# Patient Record
Sex: FEMALE | Race: WHITE | NOT HISPANIC OR LATINO | Employment: FULL TIME | URBAN - METROPOLITAN AREA
[De-identification: names, ages, dates, MRNs, and addresses within clinical notes are randomized per-mention and may not be internally consistent; named-entity substitution may affect disease eponyms.]

---

## 2017-04-10 ENCOUNTER — TREATMENT (OUTPATIENT)
Dept: PHYSICAL THERAPY | Facility: CLINIC | Age: 22
End: 2017-04-10

## 2017-04-10 DIAGNOSIS — S39.012D LUMBAR STRAIN, SUBSEQUENT ENCOUNTER: Primary | ICD-10-CM

## 2017-04-10 PROCEDURE — 97110 THERAPEUTIC EXERCISES: CPT | Performed by: PHYSICAL THERAPIST

## 2017-04-10 PROCEDURE — 97140 MANUAL THERAPY 1/> REGIONS: CPT | Performed by: PHYSICAL THERAPIST

## 2017-04-10 PROCEDURE — 97161 PT EVAL LOW COMPLEX 20 MIN: CPT | Performed by: PHYSICAL THERAPIST

## 2017-04-10 PROCEDURE — 97530 THERAPEUTIC ACTIVITIES: CPT | Performed by: PHYSICAL THERAPIST

## 2017-04-10 NOTE — PROGRESS NOTES
Orthopedic / Sports / Industrial Physical Therapy  Physical Therapy Initial Evaluation and Plan of Care    Patient Name: Carey Burden          :  1995  Referring Physician: Self Referral   Diagnosis: Lumbar strain, subsequent encounter [Z29.403W]; SI Jt dysfunction;   Date of Evaluation: 4/10/2017  ______________________________________________________________________  Subjective Evaluation    History of Present Illness  Onset date: 2-3 weeks ago.  Mechanism of injury: Packing and picking up boxes and had sudden onset of LBP initially on right and has persisted.      Pain  Current pain ratin  At worst pain ratin  Location: Across LB...initially in (R) side - Into buttocks on (R) initially - Denies NT-ing  Quality: Ache; Sharp stabbing;   Alleviating factors: Change position; Ice; Prone.  Exacerbated by: Sitting; Rising; walking, standing; stairs; bending; Denies valsalva;   Progression: no change    Diagnostic Tests  No diagnostic tests performed    Treatments  No previous or current treatments  Patient Goals  Patient/family treatment goals: Pain alleviation; Normal mobility, function, and return to normal hobby activities and normal job.         ___________________________________________________  Objective     Postural Observations  Standing posture: (R) Ilium / ASIS / PSIS higher vs (L) initially, then;  Hyperlordodic posture.        Palpation     Additional Palpation Details  Tender R>L LSS / SI;  Tender L5/S1 / Sacral base; Tender L4-5 centrally -     Active Range of Motion     Additional Active Range of Motion Details  Limited and painful Lumbar Flexion with limited reversal lumbar lordosis;   Painful Extension;  Painful (B) SB R>L;    Strength/Myotome Testing     Additional Strength Details  (B) LE Myotome WNL -     Tests     Additional Tests Details  (-) SLR;  (+) SI Jt Dysfunction / Upshear; (+) Flexed sacrum; (+) ERS Lumbar spine;      TREATMENT: %; 2.0 w/cm2; 1.0; x 10 min to  (B) LSS / SI :  Estim (IFC) to (B) LSS / SI region at end of session  THERAPEUTIC EXERCISE:  SKC; PIRIFORMIS; MODIFIED GLUTE Stretch 5/30 sec:  CAT/PRAYER Stretch (B) / R/L 3/30 sec ea  MANUAL THERAPY: Jt Mob (B) SI Jt to correct Upshear x 15 min; Mobs to Sacrum Central / L/R to facilitate sacral extension / lumbar flexion x10 min;  FUNCTIONAL ACTIVITIES: X 10 min  · TAPING / BRACING: NA  · Jt protection, ADL modification; Posture and     ___________________________________________________  Assessment & Plan     Assessment  Assessment details: Lumbar Strain; SI Jt Dysfunction;     PROBLEMS: Pain; Limited mobility; Postural Dysfunction; Limited functional ability;   Prognosis: good  Prognosis details: GOALS:  SHORT TERM GOALS: 2 weeks:  1) HEP Initiated; 2) Pain decreased 50%:   3) AROM  increased:  4) Improved functional ability grossly;     LONG TERM GOALS: 4 weeks (or at time of DISCHARGE): 1) (I) HEP; 2) AROM WFL and pain free; 3) Strength / mobility to be able to perform all ADL's and job-related activities w/o restrictions;       Plan  Planned therapy interventions: abdominal trunk stabilization, body mechanics training, flexibility, home exercise program, joint mobilization, therapeutic activities, stretching, strengthening, spinal/joint mobilization, soft tissue mobilization, postural training, neuromuscular re-education, muscle pump exercises and manual therapy (Modalities prn; )  Frequency: 3x week  Duration in weeks: 4  Treatment plan discussed with: patient      ___________________________________________________  Manual Therapy:    25     mins  51280;   Therapeutic Exercise:    20     mins  44703;     Neuromuscular Terri:    NA    mins  77805;   Therapeutic Activity:     10     mins  55788;     Ultrasound:     10     mins  07039;    Electrical Stimulation:   20     mins  13141 ( );  Dry Needling     NA     mins self-pay   Gait Training:     NA     mins  16687;  EVAL TIME:   25 min -      Timed Treatment:   65   mins                Total Treatment:     90   mins    PT SIGNATURE:   Titus Wallace, PT  DATE TREATMENT INITIATED: 4/10/2017  ___________________________________________________  Initial Certification  Certification Period: 7/9/2017  I certify that the therapy services are furnished while this patient is under my care.  The services outlined above are required by this patient, and will be reviewed every 90 days.     PHYSICIAN: ________________________________  DATE: ______          Please sign and return via fax to [phone].. Thank you, [facility].  ______________________________________________________________________  [address] [place] [address]  Phone: [phone] Fax: [phone]

## 2017-04-12 ENCOUNTER — TREATMENT (OUTPATIENT)
Dept: PHYSICAL THERAPY | Facility: CLINIC | Age: 22
End: 2017-04-12

## 2017-04-12 DIAGNOSIS — M53.3 SACROILIAC JOINT DYSFUNCTION: ICD-10-CM

## 2017-04-12 DIAGNOSIS — S39.012D LUMBAR STRAIN, SUBSEQUENT ENCOUNTER: Primary | ICD-10-CM

## 2017-04-12 PROCEDURE — 97530 THERAPEUTIC ACTIVITIES: CPT | Performed by: PHYSICAL THERAPIST

## 2017-04-12 PROCEDURE — 97110 THERAPEUTIC EXERCISES: CPT | Performed by: PHYSICAL THERAPIST

## 2017-04-12 PROCEDURE — 97140 MANUAL THERAPY 1/> REGIONS: CPT | Performed by: PHYSICAL THERAPIST

## 2017-04-12 NOTE — PROGRESS NOTES
Physical Therapy Daily Progress Note    Patient Name: Carey Burden         :  1995  Referring Physician: -Self Referral -     Subjective   Carey Burden reports: improvement with decreased pain and improved mobility and function. Still notes some pain in central and (R) LBP -increased with prolonged activity such as sitting; Pain with rising, getting out of bed in AM; Pain with bending, but can go farther before pain starts - Denies LE symptoms -     Objective   (R) ilium / ASIS / PSIS higher ; Hyperlordodic posturing; Rounded shoulder / fwd head;   (+) SI Jt dysfunction (R) / Upshear;   (+) Flexed sacrum; (+) ERS to (L) lumbar spine -   Improved, but limited flexion with pain central and (R) LB > (L) -   Tender (R) SI region, and central L4-5 and L5-S1 / sacral base;     See Exercise, Manual, and Modality Logs for complete treatment.     Functional / Therapeutic Activities:  15 min  · TAPING / BRACING: NA  · SEE EXERCISE FLOW SHEET -   · Jt protection, ADL modification; Posture and      Assessment/Plan  Lumbar Strain; SI Jt Dysfunction  Improving with decreased pain and improved mobility and function.   Remaining pain limiting mobility and function, but responds well to MT, etc.     Progress strengthening /stabilization /functional activity  _________________________________________________  Manual Therapy:    30     mins  25279;  Therapeutic Exercise:    25     mins  81697;     Neuromuscular Terri:    NA    mins  28003;    Therapeutic Activity:     15     mins  90773;     Gait Training:      NA     mins  54895;     Ultrasound:     10     mins  61643;    Electrical Stimulation:    20     mins  26164 ( );  Dry Needling     NA     mins self-pay    Timed Treatment:   80   mins                  Total Treatment:     110   mins    Titus Wallace, PT  Physical Therapist

## 2017-04-14 ENCOUNTER — TREATMENT (OUTPATIENT)
Dept: PHYSICAL THERAPY | Facility: CLINIC | Age: 22
End: 2017-04-14

## 2017-04-14 DIAGNOSIS — M53.3 SACROILIAC JOINT DYSFUNCTION: ICD-10-CM

## 2017-04-14 DIAGNOSIS — S39.012D LUMBAR STRAIN, SUBSEQUENT ENCOUNTER: Primary | ICD-10-CM

## 2017-04-14 PROCEDURE — 97110 THERAPEUTIC EXERCISES: CPT | Performed by: PHYSICAL THERAPIST

## 2017-04-14 PROCEDURE — 97530 THERAPEUTIC ACTIVITIES: CPT | Performed by: PHYSICAL THERAPIST

## 2017-04-17 NOTE — PROGRESS NOTES
Physical Therapy Daily Progress Note     Patient Name: Carey Burden : 1995  Referring Physician: -Self Referral -      Subjective   Carey Burden reports: continued improvement with decreased pain and improved mobility and function. Still notes some pain in central and (R) LBP -increased with prolonged activity such as sitting; Less pain with rising, getting out of bed in AM; Pain with bending, but can go farther before pain starts - Denies LE symptoms -      Objective   (R) ilium / ASIS / PSIS higher ; Hyperlordodic posturing; Rounded shoulder / fwd head;   (+) SI Jt dysfunction (R) / Upshear;  -   Improved, but limited flexion with pain central and (R) LB > (L) -   Tender (R) SI region, and central L4-5 and L5-S1 / sacral base;      See Exercise, Manual, and Modality Logs for complete treatment.     Functional / Therapeutic Activities:  15 min  · TAPING / BRACING: NA  · SEE EXERCISE FLOW SHEET -   · Jt protection, ADL modification; Posture and       Assessment/Plan  Lumbar Strain; SI Jt Dysfunction  Improving with decreased pain and improved mobility and function.   Remaining pain limiting mobility and function, but responds well to MT, etc.   Minimal / no pain with AROM WFL w/o pain after MT -      Progress strengthening /stabilization /functional activity  _________________________________________________  Manual Therapy:  20  mins 25849;  Therapeutic Exercise:  40  mins 45506;   Neuromuscular Terri: NA  mins 31455;   Therapeutic Activity:   15  mins 09268;   Gait Training:    NA  mins 77954;   Ultrasound:   10  mins 72568;   Electrical Stimulation:  20  mins 78497 ( );  Dry Needling   NA  mins self-pay     Timed Treatment:  85  mins Total Treatment:  110  mins     Titus Wallace, PT  Physical Therapist

## 2017-04-18 ENCOUNTER — TREATMENT (OUTPATIENT)
Dept: PHYSICAL THERAPY | Facility: CLINIC | Age: 22
End: 2017-04-18

## 2017-04-18 DIAGNOSIS — M53.3 SACROILIAC JOINT DYSFUNCTION: ICD-10-CM

## 2017-04-18 DIAGNOSIS — S39.012D LUMBAR STRAIN, SUBSEQUENT ENCOUNTER: Primary | ICD-10-CM

## 2017-04-18 PROCEDURE — 97110 THERAPEUTIC EXERCISES: CPT | Performed by: PHYSICAL THERAPIST

## 2017-04-18 PROCEDURE — 97530 THERAPEUTIC ACTIVITIES: CPT | Performed by: PHYSICAL THERAPIST

## 2017-04-19 NOTE — PROGRESS NOTES
Physical Therapy Daily Progress Note      Patient Name: Carey Burden : 1995  Referring Physician: -Self Referral -       Subjective   Carey Burden reports: continued improvement with decreased pain and improved mobility and function. Still notes some pain in central  LBP -mostly when she wakes up in AM; Pain with bending, but can go farther before pain starts - Denies LE symptoms -       Objective   Level Pelvis ; Hyperlordodic posturing; Rounded shoulder / fwd head;   (+) Flexed Sacrum    Improved,AROM all planes with mild discomfort -       See Exercise, Manual, and Modality Logs for complete treatment.      Functional / Therapeutic Activities:  15 min  · TAPING / BRACING: NA  · SEE EXERCISE FLOW SHEET -   · Jt protection, ADL modification; Posture and        Assessment/Plan  Lumbar Strain; SI Jt Dysfunction  Improving with decreased pain and improved mobility and function.   Minimal / no pain with AROM WFL w/o pain after MT -       Progress strengthening /stabilization /functional activity  _________________________________________________  Manual Therapy:  10  mins 43472;  Therapeutic Exercise:  55  mins 73169;   Neuromuscular Terri: NA  mins 19044;   Therapeutic Activity:  15  mins 98209;   Gait Training:  NA  mins 41484;   Ultrasound:  NA  mins 26137;   Electrical Stimulation:  20  mins 13140 ( );  Dry Needling  NA  mins self-pay      Timed Treatment:  80  mins Total Treatment:  110  mins      Titus Wallace PT  Physical Therapist

## 2017-04-20 ENCOUNTER — TREATMENT (OUTPATIENT)
Dept: PHYSICAL THERAPY | Facility: CLINIC | Age: 22
End: 2017-04-20

## 2017-04-20 DIAGNOSIS — S39.012D LUMBAR STRAIN, SUBSEQUENT ENCOUNTER: ICD-10-CM

## 2017-04-20 DIAGNOSIS — M53.3 SACROILIAC JOINT DYSFUNCTION: Primary | ICD-10-CM

## 2017-04-20 PROCEDURE — 97110 THERAPEUTIC EXERCISES: CPT | Performed by: PHYSICAL THERAPIST

## 2017-04-20 PROCEDURE — 97530 THERAPEUTIC ACTIVITIES: CPT | Performed by: PHYSICAL THERAPIST

## 2017-04-23 NOTE — PROGRESS NOTES
Physical Therapy Daily Progress Note      Patient Name: Carey Burden : 1995  Referring Physician: -Self Referral -       Subjective   Carey Burden reports: continued improvement with decreased pain and improved mobility and function. Still notes some pain in central LBP -mostly when she wakes up in AM; and after prolonged sitting in class - Admits to sleeping on her stomach and has a soft mattress -        Objective   Level Pelvis ; Hyperlordodic posturing; Rounded shoulder / fwd head;   (+) Flexed Sacrum    Improved,AROM all planes with mild discomfort -       See Exercise, Manual, and Modality Logs for complete treatment.      Functional / Therapeutic Activities:  15 min  · TAPING / BRACING: NA  · SEE EXERCISE FLOW SHEET -   · Jt protection, ADL modification; Posture and        Assessment/Plan  Lumbar Strain; SI Jt Dysfunction  Improving with decreased pain and improved mobility and function.   Minimal / no pain with AROM WFL w/o pain after MT -       Progress strengthening /stabilization /functional activity  _________________________________________________  Manual Therapy:  10  mins 14444;  Therapeutic Exercise:  45  mins 55835;   Neuromuscular Terri: NA  mins 72947;   Therapeutic Activity:  15  mins 30930;   Gait Training:  NA  mins 70127;   Ultrasound:  NA  mins 54450;   Electrical Stimulation:  20  mins 84717 ( );  Dry Needling  NA  mins self-pay      Timed Treatment:  70  mins Total Treatment:  100  mins      Titus Wallace PT  Physical Therapist

## 2017-04-28 ENCOUNTER — TREATMENT (OUTPATIENT)
Dept: PHYSICAL THERAPY | Facility: CLINIC | Age: 22
End: 2017-04-28

## 2017-04-28 DIAGNOSIS — M53.3 SACROILIAC JOINT DYSFUNCTION: Primary | ICD-10-CM

## 2017-04-28 DIAGNOSIS — S39.012D LUMBAR STRAIN, SUBSEQUENT ENCOUNTER: ICD-10-CM

## 2017-04-28 PROCEDURE — 97530 THERAPEUTIC ACTIVITIES: CPT | Performed by: PHYSICAL THERAPIST

## 2017-04-28 PROCEDURE — 97110 THERAPEUTIC EXERCISES: CPT | Performed by: PHYSICAL THERAPIST

## 2017-05-09 ENCOUNTER — TREATMENT (OUTPATIENT)
Dept: PHYSICAL THERAPY | Facility: CLINIC | Age: 22
End: 2017-05-09

## 2017-05-09 DIAGNOSIS — S39.012D LUMBAR STRAIN, SUBSEQUENT ENCOUNTER: ICD-10-CM

## 2017-05-09 DIAGNOSIS — M53.3 SACROILIAC JOINT DYSFUNCTION: Primary | ICD-10-CM

## 2017-05-09 PROCEDURE — 97530 THERAPEUTIC ACTIVITIES: CPT | Performed by: PHYSICAL THERAPIST

## 2017-05-09 PROCEDURE — 97110 THERAPEUTIC EXERCISES: CPT | Performed by: PHYSICAL THERAPIST

## 2017-05-10 ENCOUNTER — TREATMENT (OUTPATIENT)
Dept: PHYSICAL THERAPY | Facility: CLINIC | Age: 22
End: 2017-05-10

## 2017-05-10 DIAGNOSIS — S39.012D LUMBAR STRAIN, SUBSEQUENT ENCOUNTER: ICD-10-CM

## 2017-05-10 DIAGNOSIS — M53.3 SACROILIAC JOINT DYSFUNCTION: Primary | ICD-10-CM

## 2017-05-10 PROCEDURE — 97110 THERAPEUTIC EXERCISES: CPT | Performed by: PHYSICAL THERAPIST

## 2017-05-10 PROCEDURE — 97530 THERAPEUTIC ACTIVITIES: CPT | Performed by: PHYSICAL THERAPIST

## 2017-05-17 ENCOUNTER — TREATMENT (OUTPATIENT)
Dept: PHYSICAL THERAPY | Facility: CLINIC | Age: 22
End: 2017-05-17

## 2017-05-17 DIAGNOSIS — S39.012D LUMBAR STRAIN, SUBSEQUENT ENCOUNTER: ICD-10-CM

## 2017-05-17 DIAGNOSIS — M53.3 SACROILIAC JOINT DYSFUNCTION: Primary | ICD-10-CM

## 2017-05-17 PROCEDURE — 97112 NEUROMUSCULAR REEDUCATION: CPT | Performed by: PHYSICAL THERAPIST

## 2017-05-17 PROCEDURE — 97110 THERAPEUTIC EXERCISES: CPT | Performed by: PHYSICAL THERAPIST

## 2017-05-17 PROCEDURE — 97530 THERAPEUTIC ACTIVITIES: CPT | Performed by: PHYSICAL THERAPIST

## 2017-05-25 ENCOUNTER — TREATMENT (OUTPATIENT)
Dept: PHYSICAL THERAPY | Facility: CLINIC | Age: 22
End: 2017-05-25

## 2017-05-25 DIAGNOSIS — M53.3 SACROILIAC JOINT DYSFUNCTION: Primary | ICD-10-CM

## 2017-05-25 DIAGNOSIS — S39.012D LUMBAR STRAIN, SUBSEQUENT ENCOUNTER: ICD-10-CM

## 2017-05-25 PROCEDURE — 97110 THERAPEUTIC EXERCISES: CPT | Performed by: PHYSICAL THERAPIST

## 2017-05-25 PROCEDURE — 97530 THERAPEUTIC ACTIVITIES: CPT | Performed by: PHYSICAL THERAPIST

## 2017-05-25 PROCEDURE — 97112 NEUROMUSCULAR REEDUCATION: CPT | Performed by: PHYSICAL THERAPIST

## 2018-05-09 ENCOUNTER — TREATMENT (OUTPATIENT)
Dept: PHYSICAL THERAPY | Facility: CLINIC | Age: 23
End: 2018-05-09

## 2018-05-09 DIAGNOSIS — M53.3 SACROILIAC JOINT DYSFUNCTION: Primary | ICD-10-CM

## 2018-05-09 DIAGNOSIS — S39.012D LUMBAR STRAIN, SUBSEQUENT ENCOUNTER: ICD-10-CM

## 2018-05-09 PROCEDURE — 97161 PT EVAL LOW COMPLEX 20 MIN: CPT | Performed by: PHYSICAL THERAPIST

## 2018-05-09 PROCEDURE — 97110 THERAPEUTIC EXERCISES: CPT | Performed by: PHYSICAL THERAPIST

## 2018-05-09 PROCEDURE — 97530 THERAPEUTIC ACTIVITIES: CPT | Performed by: PHYSICAL THERAPIST

## 2018-05-09 PROCEDURE — 97140 MANUAL THERAPY 1/> REGIONS: CPT | Performed by: PHYSICAL THERAPIST

## 2018-05-09 NOTE — PROGRESS NOTES
Orthopedic / Sports / Industrial Physical Therapy  Physical Therapy Initial Evaluation and Plan of Care    Patient Name: Carey Burden          :  1995  Referring Physician: Carey Morataya MD  Diagnosis: Sacroiliac joint dysfunction [M53.3]  ; Lumbar Strain  Date of Evaluation: 2018  ______________________________________________________________________      Subjective Evaluation    History of Present Illness  Date of onset: 2018  Mechanism of injury: Rolled over in bed and felt immediate pain in central LB -      Patient Occupation: Student - Nursing Pain  Current pain ratin  At worst pain ratin  Location: Central LBP R>L  Quality: Sharp; Ache; Sore.  Alleviating factors: Prone;   Exacerbated by: Bend over; Sitting; Rising; palpation; walking; prolonged positioning - Tying shoes -   Symptom course: Improved overall -     Social Support  Lives with: parents    Diagnostic Tests  No diagnostic tests performed    Treatments  Previous treatment: physical therapy  Current treatment comments: Tylenol and ibuprofen.     Patient Goals  Patient/family treatment goals: Pain alleviation; Normal mobility; function; and able to perform duties for her [facility] clinicals -          ___________________________________________________  Objective       Postural Observations    Additional Postural Observation Details  Hyperlordosis; (L) Ilium / ASIS / PSIS higher  Rounded shoulder posture with increased T-kyphosis    Tenderness     Additional Tenderness Details  Tender central L4,5,Sacral base; (L) LPS same; (L)>(R) SI region    Active Range of Motion     Additional Active Range of Motion Details  Painful Flexion central LSS and R/L 2/3 normal  Extension WF/NL with pain at LSS/Sacral base region  SB WFL with pain R/L LSS/sacral region     Strength/Myotome Testing     Additional Strength Details  LE Myotomes WNL (B); Heel/Toe Walk WNL (B)    Tests     Additional Tests Details  (+) SI Jt Dysfunction (L)  >(R)  (+) Flexed Sacrum  (+) SLR (L)      See Treatment Flow sheet for Exercises, Manual therapy, and modalities.   FUNCTIONAL ACTIVITIES: X 10 min  · TAPING / BRACING:   · Jt protection, ADL modification; Posture and     ___________________________________________________  Assessment & Plan     Assessment  Assessment details: Lumbar strain; SI Jt Dysfunction L>R    PROBLEMS: Pain; Limited mobility, function, and unable to participate in hobby activities  PROGNOSIS: Good    GOALS:   SHORT TERM GOALS: 2 weeks:  1) HEP Initiated; 2) Pain decreased 50%:   3) AROM  increased:  4) Improved functional ability grossly;   LONG TERM GOALS: 4 weeks (or at time of DISCHARGE): 1) (I) HEP; 2) AROM WFL and pain free; 3) Strength / mobility to be able to perform all ADL's and job-related activities w/o restrictions;      Plan  Planned therapy interventions: abdominal trunk stabilization, body mechanics training, flexibility, home exercise program, manual therapy, neuromuscular re-education, postural training, soft tissue mobilization, spinal/joint mobilization, strengthening, stretching and therapeutic activities (Modalities prn; Taping prn; )  Frequency: 2x week  Duration in weeks: 4  Treatment plan discussed with: patient    ___________________________________________________  Manual Therapy:    15     mins  34688;   Therapeutic Exercise:    10     mins  14477;     Neuromuscular Terri:        mins  16203;   Therapeutic Activity:     10     mins  56965;     Ultrasound:     10     mins  38096;    Electrical Stimulation:   20     mins  37916 ( );  Dry Needling          mins self-pay   Gait Training:          mins  19121;  EVAL TIME:   20 mins    Timed Treatment:   45   mins                Total Treatment:     95   mins    PT SIGNATURE:   Titus Wallace, PT  DATE TREATMENT INITIATED: 5/9/2018  ___________________________________________________  Initial Certification  Certification Period: 8/7/2018  I certify that the  therapy services are furnished while this patient is under my care.  The services outlined above are required by this patient, and will be reviewed every 90 days.     PHYSICIAN: ________________________________  DATE: ______  Carey Morataya MD        Please sign and return via fax to [phone].. Thank you, [facility].  ______________________________________________________________________  [address] [place] [address]  Phone: [phone] Fax: [phone]

## 2018-05-14 ENCOUNTER — TREATMENT (OUTPATIENT)
Dept: PHYSICAL THERAPY | Facility: CLINIC | Age: 23
End: 2018-05-14

## 2018-05-14 DIAGNOSIS — M53.3 SACROILIAC JOINT DYSFUNCTION: Primary | ICD-10-CM

## 2018-05-14 DIAGNOSIS — S39.012D LUMBAR STRAIN, SUBSEQUENT ENCOUNTER: ICD-10-CM

## 2018-05-14 PROCEDURE — 97110 THERAPEUTIC EXERCISES: CPT | Performed by: PHYSICAL THERAPIST

## 2018-05-14 PROCEDURE — 97140 MANUAL THERAPY 1/> REGIONS: CPT | Performed by: PHYSICAL THERAPIST

## 2018-05-14 PROCEDURE — 97530 THERAPEUTIC ACTIVITIES: CPT | Performed by: PHYSICAL THERAPIST

## 2018-05-14 NOTE — PROGRESS NOTES
Physical Therapy Daily Progress Note    Patient Name: Carey Burden         :  1995  Referring Physician: Carey Morataya MD    Subjective   Carey Burden reports: minimal / no pain after last session and for the next couple of days - She only noted soreness in central LSS and (L) late yesterday and today intermittently -    Objective   (L) Ilium / PSIS / ASIS higher vs (R); Hyperlordosis;   (+) SI Jt Dysfunction / Upshear (L);  Flexed sacrum (central and (L))  Improved AROM all planes lumbar spine with no pain except for SB with pain (L) LSS/SI / sacral region    See Exercise, Manual, and Modality Logs for complete treatment.     Functional / Therapeutic Activities:  15 min  · TAPING / BRACING: NA  · See flow sheet -   · Jt protection, ADL modification; Posture and      Assessment/Plan  Lumbar strain; SI Jt Dysfunction L>R  Improving with decreased pain and improved mobility and function -     Progress strengthening /stabilization /functional activity       _________________________________________________  Manual Therapy:    15     mins  46518;  Therapeutic Exercise:    25     mins  91665;     Neuromuscular Terri:        mins  54149;    Therapeutic Activity:     15     mins  06420;     Gait Training:           mins  17437;     Ultrasound:          mins  99135;    Electrical Stimulation:    15     mins  60342 ( );  Dry Needling          mins self-pay    Timed Treatment:   55   mins                  Total Treatment:     80   mins    Titus Wallace PT  Physical Therapist

## 2018-05-16 ENCOUNTER — TREATMENT (OUTPATIENT)
Dept: PHYSICAL THERAPY | Facility: CLINIC | Age: 23
End: 2018-05-16

## 2018-05-16 DIAGNOSIS — M53.3 SACROILIAC JOINT DYSFUNCTION: Primary | ICD-10-CM

## 2018-05-16 DIAGNOSIS — S39.012D LUMBAR STRAIN, SUBSEQUENT ENCOUNTER: ICD-10-CM

## 2018-05-16 PROCEDURE — 97110 THERAPEUTIC EXERCISES: CPT | Performed by: PHYSICAL THERAPIST

## 2018-05-16 PROCEDURE — 97140 MANUAL THERAPY 1/> REGIONS: CPT | Performed by: PHYSICAL THERAPIST

## 2018-05-16 PROCEDURE — 97530 THERAPEUTIC ACTIVITIES: CPT | Performed by: PHYSICAL THERAPIST

## 2018-05-16 NOTE — PROGRESS NOTES
Physical Therapy Daily Progress Note     Patient Name: Carey Burden         :  1995  Referring Physician: Carey Morataya MD     Subjective   Carey Burden reports: minimal / no pain after last session and for the next couple of days - She only notes some soreness in central LSS and (L)  -     Objective   Improved pelvic alignment -     Flexed sacrum (central and (L))  Improved AROM all planes lumbar spine with no pain other than HS tightness L>R and improved reversal of lumbar lordosis with flexion -    See Exercise, Manual, and Modality Logs for complete treatment.     Functional / Therapeutic Activities:  15 min  · TAPING / BRACING: NA  · See flow sheet -   · Jt protection, ADL modification; Posture and       Assessment/Plan  Lumbar strain; SI Jt Dysfunction L>R  Improving with decreased pain and improved mobility and function -      Progress strengthening /stabilization /functional activity     _________________________________________________  Manual Therapy:            10     mins  63617;  Therapeutic Exercise:    40     mins  74484;     Neuromuscular Terri:        mins  13599;    Therapeutic Activity:      20     mins  96192;     Gait Training:                      mins  95761;     Ultrasound:                          mins  20952;    Electrical Stimulation:    15     mins  94784 ( );  Dry Needling                       mins self-pay     Timed Treatment:   70   mins                  Total Treatment:     90   mins     Titus Wallace PT  Physical Therapist

## 2018-05-23 ENCOUNTER — TREATMENT (OUTPATIENT)
Dept: PHYSICAL THERAPY | Facility: CLINIC | Age: 23
End: 2018-05-23

## 2018-05-23 DIAGNOSIS — S39.012D LUMBAR STRAIN, SUBSEQUENT ENCOUNTER: ICD-10-CM

## 2018-05-23 DIAGNOSIS — M53.3 SACROILIAC JOINT DYSFUNCTION: Primary | ICD-10-CM

## 2018-05-23 PROCEDURE — 97530 THERAPEUTIC ACTIVITIES: CPT | Performed by: PHYSICAL THERAPIST

## 2018-05-23 PROCEDURE — 97110 THERAPEUTIC EXERCISES: CPT | Performed by: PHYSICAL THERAPIST

## 2018-05-28 NOTE — PROGRESS NOTES
Physical Therapy Daily Progress Note     Patient Name: Carey Burden         :  1995  Referring Physician: Carey Morataya MD     Subjective   Carey Burden reports: minimal / no pain after last session and for the next couple of days -     Objective   Improved pelvic alignment -    (-) Flexed sacrum (central and (L))  Improved AROM all planes lumbar spine with no pain other than HS tightness L>R and improved reversal of lumbar lordosis with flexion -     See Exercise, Manual, and Modality Logs for complete treatment.     Functional / Therapeutic Activities:  20 min  · TAPING / BRACING: NA  · See flow sheet -   · Jt protection, ADL modification; Posture and       Assessment/Plan  Lumbar strain; SI Jt Dysfunction L>R  Improving with decreased pain and improved mobility and function -      Progress strengthening /stabilization /functional activity  DC to HEP next session if continued improvement =      _________________________________________________  Manual Therapy:                 mins  44361;  Therapeutic Exercise:    40     mins  28336;     Neuromuscular Terri:        mins  82479;    Therapeutic Activity:      20     mins  37974;     Gait Training:                      mins  91243;     Ultrasound:                          mins  36381;    Electrical Stimulation:    15     mins  03278 ( );  Dry Needling                       mins self-pay     Timed Treatment:   60   mins                  Total Treatment:     85   mins     Titus Wallace PT  Physical Therapist

## 2018-06-01 ENCOUNTER — TREATMENT (OUTPATIENT)
Dept: PHYSICAL THERAPY | Facility: CLINIC | Age: 23
End: 2018-06-01

## 2018-06-01 DIAGNOSIS — M53.3 SACROILIAC JOINT DYSFUNCTION: Primary | ICD-10-CM

## 2018-06-01 DIAGNOSIS — S39.012D LUMBAR STRAIN, SUBSEQUENT ENCOUNTER: ICD-10-CM

## 2018-06-01 PROCEDURE — 97110 THERAPEUTIC EXERCISES: CPT | Performed by: PHYSICAL THERAPIST

## 2018-06-01 PROCEDURE — 97530 THERAPEUTIC ACTIVITIES: CPT | Performed by: PHYSICAL THERAPIST

## 2018-06-03 NOTE — PROGRESS NOTES
Physical Therapy Daily Progress Note     Patient Name: Carey Burden         :  1995  Referring Physician: Carey Morataya MD     Subjective   Carey Burden reports: minimal / no pain after last session and for the next couple of days - Notes no functional limitations - Back to kick boxing class w/o pain -      Objective   Improved pelvic alignment -    (-) Flexed sacrum (central and (L))  Improved AROM all planes lumbar spine with no pain other than HS tightness L>R and improved reversal of lumbar lordosis with flexion -      See Exercise, Manual, and Modality Logs for complete treatment.     Functional / Therapeutic Activities:  20 min  · TAPING / BRACING: NA  · See flow sheet -   · Jt protection, ADL modification; Posture and       Assessment/Plan  Lumbar strain; SI Jt Dysfunction L>R  Improving with decreased pain and improved mobility and function -   No functional limitations-  All goals met -      DC to HEP  -     _________________________________________________  Manual Therapy:                 mins  49751;  Therapeutic Exercise:    40     mins  93035;     Neuromuscular Terri:        mins  31530;    Therapeutic Activity:      20     mins  26304;     Gait Training:                      mins  39787;     Ultrasound:                          mins  72725;    Electrical Stimulation:         mins  71873 ( );  Dry Needling                       mins self-pay     Timed Treatment:   60   mins                  Total Treatment:     70   mins     Titus Wallace PT  Physical Therapist

## 2021-09-07 ENCOUNTER — APPOINTMENT (OUTPATIENT)
Dept: GENERAL RADIOLOGY | Facility: HOSPITAL | Age: 26
End: 2021-09-07

## 2021-09-07 ENCOUNTER — HOSPITAL ENCOUNTER (EMERGENCY)
Facility: HOSPITAL | Age: 26
Discharge: HOME | End: 2021-09-07
Admitting: EMERGENCY MEDICINE

## 2021-09-07 VITALS
TEMPERATURE: 98.4 F | BODY MASS INDEX: 48.55 KG/M2 | RESPIRATION RATE: 15 BRPM | OXYGEN SATURATION: 100 % | HEIGHT: 64 IN | WEIGHT: 284.39 LBS | SYSTOLIC BLOOD PRESSURE: 126 MMHG | DIASTOLIC BLOOD PRESSURE: 78 MMHG | HEART RATE: 79 BPM

## 2021-09-07 DIAGNOSIS — R07.89 CHEST WALL PAIN: ICD-10-CM

## 2021-09-07 DIAGNOSIS — Z92.29 COVID-19 VACCINE SERIES COMPLETED: ICD-10-CM

## 2021-09-07 DIAGNOSIS — F41.9 ANXIETY: ICD-10-CM

## 2021-09-07 DIAGNOSIS — U07.1 COVID-19: Primary | ICD-10-CM

## 2021-09-07 LAB
ALBUMIN SERPL-MCNC: 4.1 G/DL (ref 3.5–5.2)
ALBUMIN/GLOB SERPL: 1.2 G/DL
ALP SERPL-CCNC: 72 U/L (ref 39–117)
ALT SERPL W P-5'-P-CCNC: 31 U/L (ref 1–33)
ANION GAP SERPL CALCULATED.3IONS-SCNC: 14 MMOL/L (ref 5–15)
AST SERPL-CCNC: 29 U/L (ref 1–32)
BASOPHILS # BLD AUTO: 0 10*3/MM3 (ref 0–0.2)
BASOPHILS NFR BLD AUTO: 0.6 % (ref 0–1.5)
BILIRUB SERPL-MCNC: 0.3 MG/DL (ref 0–1.2)
BUN SERPL-MCNC: 5 MG/DL (ref 6–20)
BUN/CREAT SERPL: 7.6 (ref 7–25)
CALCIUM SPEC-SCNC: 9 MG/DL (ref 8.6–10.5)
CHLORIDE SERPL-SCNC: 105 MMOL/L (ref 98–107)
CO2 SERPL-SCNC: 21 MMOL/L (ref 22–29)
CREAT SERPL-MCNC: 0.66 MG/DL (ref 0.57–1)
D DIMER PPP FEU-MCNC: 0.2 MG/L (FEU) (ref 0–0.59)
DEPRECATED RDW RBC AUTO: 39.8 FL (ref 37–54)
EOSINOPHIL # BLD AUTO: 0 10*3/MM3 (ref 0–0.4)
EOSINOPHIL NFR BLD AUTO: 0.2 % (ref 0.3–6.2)
ERYTHROCYTE [DISTWIDTH] IN BLOOD BY AUTOMATED COUNT: 13.4 % (ref 12.3–15.4)
GFR SERPL CREATININE-BSD FRML MDRD: 111 ML/MIN/1.73
GLOBULIN UR ELPH-MCNC: 3.5 GM/DL
GLUCOSE SERPL-MCNC: 100 MG/DL (ref 65–99)
HCT VFR BLD AUTO: 38.3 % (ref 34–46.6)
HGB BLD-MCNC: 13 G/DL (ref 12–15.9)
LYMPHOCYTES # BLD AUTO: 2 10*3/MM3 (ref 0.7–3.1)
LYMPHOCYTES NFR BLD AUTO: 29.7 % (ref 19.6–45.3)
MCH RBC QN AUTO: 28.4 PG (ref 26.6–33)
MCHC RBC AUTO-ENTMCNC: 34 G/DL (ref 31.5–35.7)
MCV RBC AUTO: 83.5 FL (ref 79–97)
MONOCYTES # BLD AUTO: 0.4 10*3/MM3 (ref 0.1–0.9)
MONOCYTES NFR BLD AUTO: 5.9 % (ref 5–12)
NEUTROPHILS NFR BLD AUTO: 4.3 10*3/MM3 (ref 1.7–7)
NEUTROPHILS NFR BLD AUTO: 63.6 % (ref 42.7–76)
NRBC BLD AUTO-RTO: 0.1 /100 WBC (ref 0–0.2)
PLATELET # BLD AUTO: 195 10*3/MM3 (ref 140–450)
PMV BLD AUTO: 9.4 FL (ref 6–12)
POTASSIUM SERPL-SCNC: 3.2 MMOL/L (ref 3.5–5.2)
PROT SERPL-MCNC: 7.6 G/DL (ref 6–8.5)
RBC # BLD AUTO: 4.59 10*6/MM3 (ref 3.77–5.28)
SODIUM SERPL-SCNC: 140 MMOL/L (ref 136–145)
WBC # BLD AUTO: 6.8 10*3/MM3 (ref 3.4–10.8)

## 2021-09-07 PROCEDURE — 96361 HYDRATE IV INFUSION ADD-ON: CPT

## 2021-09-07 PROCEDURE — 80053 COMPREHEN METABOLIC PANEL: CPT | Performed by: NURSE PRACTITIONER

## 2021-09-07 PROCEDURE — 25010000002 ONDANSETRON PER 1 MG: Performed by: NURSE PRACTITIONER

## 2021-09-07 PROCEDURE — 71045 X-RAY EXAM CHEST 1 VIEW: CPT

## 2021-09-07 PROCEDURE — 96374 THER/PROPH/DIAG INJ IV PUSH: CPT

## 2021-09-07 PROCEDURE — 85025 COMPLETE CBC W/AUTO DIFF WBC: CPT | Performed by: NURSE PRACTITIONER

## 2021-09-07 PROCEDURE — 36415 COLL VENOUS BLD VENIPUNCTURE: CPT | Performed by: NURSE PRACTITIONER

## 2021-09-07 PROCEDURE — 25010000002 LORAZEPAM PER 2 MG: Performed by: NURSE PRACTITIONER

## 2021-09-07 PROCEDURE — 36415 COLL VENOUS BLD VENIPUNCTURE: CPT

## 2021-09-07 PROCEDURE — 99283 EMERGENCY DEPT VISIT LOW MDM: CPT

## 2021-09-07 PROCEDURE — 25010000002 KETOROLAC TROMETHAMINE PER 15 MG: Performed by: NURSE PRACTITIONER

## 2021-09-07 PROCEDURE — 96375 TX/PRO/DX INJ NEW DRUG ADDON: CPT

## 2021-09-07 PROCEDURE — 85379 FIBRIN DEGRADATION QUANT: CPT | Performed by: NURSE PRACTITIONER

## 2021-09-07 RX ORDER — BROMPHENIRAMINE MALEATE, PSEUDOEPHEDRINE HYDROCHLORIDE, AND DEXTROMETHORPHAN HYDROBROMIDE 2; 30; 10 MG/5ML; MG/5ML; MG/5ML
5 SYRUP ORAL 4 TIMES DAILY PRN
Qty: 120 ML | Refills: 0 | OUTPATIENT
Start: 2021-09-07 | End: 2022-09-30

## 2021-09-07 RX ORDER — LORAZEPAM 2 MG/ML
0.5 INJECTION INTRAMUSCULAR ONCE
Status: COMPLETED | OUTPATIENT
Start: 2021-09-07 | End: 2021-09-07

## 2021-09-07 RX ORDER — KETOROLAC TROMETHAMINE 15 MG/ML
15 INJECTION, SOLUTION INTRAMUSCULAR; INTRAVENOUS ONCE
Status: COMPLETED | OUTPATIENT
Start: 2021-09-07 | End: 2021-09-07

## 2021-09-07 RX ORDER — HYDROCODONE BITARTRATE AND ACETAMINOPHEN 7.5; 325 MG/1; MG/1
1 TABLET ORAL ONCE
Status: COMPLETED | OUTPATIENT
Start: 2021-09-07 | End: 2021-09-07

## 2021-09-07 RX ORDER — DICLOFENAC SODIUM 75 MG/1
75 TABLET, DELAYED RELEASE ORAL 2 TIMES DAILY PRN
Qty: 20 TABLET | Refills: 0 | Status: SHIPPED | OUTPATIENT
Start: 2021-09-07 | End: 2021-09-17

## 2021-09-07 RX ORDER — SODIUM CHLORIDE 0.9 % (FLUSH) 0.9 %
10 SYRINGE (ML) INJECTION AS NEEDED
Status: DISCONTINUED | OUTPATIENT
Start: 2021-09-07 | End: 2021-09-07 | Stop reason: HOSPADM

## 2021-09-07 RX ORDER — ONDANSETRON 2 MG/ML
4 INJECTION INTRAMUSCULAR; INTRAVENOUS ONCE
Status: COMPLETED | OUTPATIENT
Start: 2021-09-07 | End: 2021-09-07

## 2021-09-07 RX ORDER — DEXAMETHASONE 4 MG/1
6 TABLET ORAL 2 TIMES DAILY WITH MEALS
Qty: 15 TABLET | Refills: 0 | Status: SHIPPED | OUTPATIENT
Start: 2021-09-07 | End: 2021-09-12

## 2021-09-07 RX ADMIN — HYDROCODONE BITARTRATE AND ACETAMINOPHEN 1 TABLET: 7.5; 325 TABLET ORAL at 13:50

## 2021-09-07 RX ADMIN — SODIUM CHLORIDE 1000 ML: 9 INJECTION, SOLUTION INTRAVENOUS at 12:06

## 2021-09-07 RX ADMIN — KETOROLAC TROMETHAMINE 15 MG: 15 INJECTION, SOLUTION INTRAMUSCULAR; INTRAVENOUS at 12:06

## 2021-09-07 RX ADMIN — LORAZEPAM 0.5 MG: 2 INJECTION INTRAMUSCULAR; INTRAVENOUS at 12:06

## 2021-09-07 RX ADMIN — ONDANSETRON 4 MG: 2 INJECTION INTRAMUSCULAR; INTRAVENOUS at 12:06

## 2021-09-07 NOTE — ED NOTES
Patient tested positive for Covid yesterday. Had Regeneron yesterday. Today she complains of Soa, and chest discomfort     Nancy Zaragoza RN  09/07/21 8550

## 2021-09-07 NOTE — ED PROVIDER NOTES
Subjective   Patient is a 25-year-old obese female who received the Pfizer vaccine in March of this year but tested positive for Covid 1 week ago she comes in today very anxious breathing about 30 times a minute stating that she cannot catch her breath but her oxygen saturation was 100%.  She received the Regeneron infusion yesterday-states she was feeling worse today she also complains of some chest pain with her breathing.  States she has had cough which has been dry she denies fever in the last 24 hours-she rates her pain a 6/10          Review of Systems   Constitutional: Negative for chills, fatigue and fever.   HENT: Negative for congestion, tinnitus and trouble swallowing.    Eyes: Negative for photophobia, discharge and redness.   Respiratory: Positive for cough and shortness of breath.    Cardiovascular: Positive for chest pain. Negative for palpitations.   Gastrointestinal: Negative for abdominal pain, diarrhea, nausea and vomiting.   Genitourinary: Negative for dysuria, frequency and urgency.   Musculoskeletal: Negative for back pain, joint swelling and myalgias.   Skin: Negative for rash.   Neurological: Negative for dizziness and headaches.   Psychiatric/Behavioral: Negative for confusion. The patient is nervous/anxious.    All other systems reviewed and are negative.      History reviewed. No pertinent past medical history.    No Known Allergies    Past Surgical History:   Procedure Laterality Date   • ADENOIDECTOMY     • TONSILLECTOMY         History reviewed. No pertinent family history.    Social History     Socioeconomic History   • Marital status: Single     Spouse name: Not on file   • Number of children: Not on file   • Years of education: Not on file   • Highest education level: Not on file   Tobacco Use   • Smoking status: Never Smoker   • Smokeless tobacco: Never Used   Substance and Sexual Activity   • Alcohol use: Yes     Comment: occassionally           Objective   Physical Exam  Vitals  reviewed.   Constitutional:       General: She is not in acute distress.     Appearance: She is well-developed. She is obese. She is not ill-appearing, toxic-appearing or diaphoretic.   HENT:      Head: Normocephalic and atraumatic.      Mouth/Throat:      Mouth: Mucous membranes are moist.   Eyes:      Conjunctiva/sclera: Conjunctivae normal.      Pupils: Pupils are equal, round, and reactive to light.   Cardiovascular:      Rate and Rhythm: Normal rate and regular rhythm.      Heart sounds: Normal heart sounds.   Pulmonary:      Effort: Pulmonary effort is normal. No respiratory distress.      Breath sounds: Examination of the right-lower field reveals decreased breath sounds. Examination of the left-lower field reveals decreased breath sounds. Decreased breath sounds present. No wheezing.   Abdominal:      General: Bowel sounds are normal. There is no distension.      Palpations: Abdomen is soft. There is no mass.      Tenderness: There is no abdominal tenderness. There is no guarding or rebound.   Musculoskeletal:         General: No deformity. Normal range of motion.      Cervical back: Normal range of motion and neck supple.   Skin:     General: Skin is warm and dry.      Capillary Refill: Capillary refill takes less than 2 seconds.   Neurological:      General: No focal deficit present.      Mental Status: She is alert and oriented to person, place, and time.      GCS: GCS eye subscore is 4. GCS verbal subscore is 5. GCS motor subscore is 6.      Cranial Nerves: No cranial nerve deficit.      Sensory: No sensory deficit.      Deep Tendon Reflexes: Reflexes normal.   Psychiatric:         Mood and Affect: Mood is anxious.         Behavior: Behavior normal.         Procedures           ED Course  ED Course as of Sep 07 1429   Tue Sep 07, 2021   1334 Ddimer will be drawn at this time due to the patient continue complaint of Chest pain    [KW]   1412 D dimer negative.-    [KW]      ED Course User Index  [KW]  "Marcy Andre, APRN      /90   Pulse 93   Temp 98.3 °F (36.8 °C)   Resp 19   Ht 162.6 cm (64\")   Wt 129 kg (284 lb 6.3 oz)   LMP 08/07/2021   SpO2 100%   BMI 48.82 kg/m²   Labs Reviewed   COMPREHENSIVE METABOLIC PANEL - Abnormal; Notable for the following components:       Result Value    Glucose 100 (*)     BUN 5 (*)     Potassium 3.2 (*)     CO2 21.0 (*)     All other components within normal limits    Narrative:     GFR Normal >60  Chronic Kidney Disease <60  Kidney Failure <15     CBC WITH AUTO DIFFERENTIAL - Abnormal; Notable for the following components:    Eosinophil % 0.2 (*)     All other components within normal limits   D-DIMER, QUANTITATIVE - Normal    Narrative:     Reference Range  --------------------------------------------------------------------     < 0.50   Negative Predictive Value  0.50-0.59   Indeterminate    >= 0.60   Probable VTE             A very low percentage of patients with DVT may yield D-Dimer results   below the cut-off of 0.50 mg/L FEU.  This is known to be more   prevalent in patients with distal DVT.             Results of this test should always be interpreted in conjunction with   the patient's medical history, clinical presentation and other   findings.  Clinical diagnosis should not be based on the result of   INNOVANCE D-Dimer alone.   CBC AND DIFFERENTIAL    Narrative:     The following orders were created for panel order CBC & Differential.  Procedure                               Abnormality         Status                     ---------                               -----------         ------                     CBC Auto Differential[[id number]]        Abnormal            Final result                 Please view results for these tests on the individual orders.     Medications   sodium chloride 0.9 % flush 10 mL (has no administration in time range)   sodium chloride 0.9 % bolus 1,000 mL (1,000 mL Intravenous New Bag 9/7/21 1206)   ondansetron (ZOFRAN) " injection 4 mg (4 mg Intravenous Given 9/7/21 1206)   LORazepam (ATIVAN) injection 0.5 mg (0.5 mg Intravenous Given 9/7/21 1206)   ketorolac (TORADOL) injection 15 mg (15 mg Intravenous Given 9/7/21 1206)   HYDROcodone-acetaminophen (NORCO) 7.5-325 MG per tablet 1 tablet (1 tablet Oral Given 9/7/21 1350)     XR Chest 1 View    Result Date: 9/7/2021  No active disease  Electronically Signed By-Rui Trinidad MD On:9/7/2021 12:40 PM This report was finalized on [id number] by  Rui Trinidad MD.                                         MDM  Number of Diagnoses or Management Options  Anxiety  Chest wall pain  COVID-19 vaccine series completed  COVID-19  Diagnosis management comments: PERC Rule for Pulmonary Embolism - MDCalc  Calculated on Sep 07 2021 1:24 PM  0 criteria -> No need for further workup, as <2% chance of PE. If no criteria are positive and clinician's pre-test probability is <15%, PERC Rule criteria are satisfied.    Patient had IV established and blood work was obtained.  She was given Ativan for anxiety Toradol and Zofran.  On reevaluation her breathing per minute was down to 20-22.  She appears more relaxed.  She still is very anxious and states she is still having some chest discomfort her heart rate on reevaluation was below 100 she does not use birth control.-       Amount and/or Complexity of Data Reviewed  Clinical lab tests: reviewed  Tests in the radiology section of CPT®: reviewed        Final diagnoses:   COVID-19   Chest wall pain   COVID-19 vaccine series completed   Anxiety       ED Disposition  ED Disposition     ED Disposition Condition Comment    Discharge Stable           Carey Morataya MD  [address] [facility] [address] [phone]    In 3 days  If symptoms worsen, As needed         Medication List      New Prescriptions    brompheniramine-pseudoephedrine-DM 30-2-10 MG/5ML syrup  Take 5 mL by mouth 4 (Four) Times a Day As Needed for Congestion or Cough.      dexamethasone 4 MG tablet  Commonly known as: DECADRON  Take 1.5 tablets by mouth 2 (Two) Times a Day With Meals for 5 days.     diclofenac 75 MG EC tablet  Commonly known as: VOLTAREN  Take 1 tablet by mouth 2 (Two) Times a Day As Needed (pain) for up to 10 days.           Where to Get Your Medications      These medications were sent to [facility] - [place], [place] - [address] [facility]  - [phone]  - [phone] [address] [place] [address]    Phone: [phone]   · brompheniramine-pseudoephedrine-DM 30-2-10 MG/5ML syrup  · dexamethasone 4 MG tablet  · diclofenac 75 MG EC tablet          Marcy Andre, APRN  09/07/21 9322

## 2021-09-07 NOTE — DISCHARGE INSTRUCTIONS
Push clear liquids    Use Voltaren as needed for pain do not mix with Motrin ibuprofen Advil or Aleve    May also use Tylenol with Voltaren for pain    Use Bromfed as needed for cough and congestion    Use dexamethasone as directed till gone  Follow-up with primary care for any further problems or return to the ER for worsening symptoms

## 2021-11-19 ENCOUNTER — HOSPITAL ENCOUNTER (EMERGENCY)
Facility: HOSPITAL | Age: 26
Discharge: HOME | End: 2021-11-19
Attending: EMERGENCY MEDICINE | Admitting: EMERGENCY MEDICINE

## 2021-11-19 ENCOUNTER — APPOINTMENT (OUTPATIENT)
Dept: CT IMAGING | Facility: HOSPITAL | Age: 26
End: 2021-11-19

## 2021-11-19 ENCOUNTER — APPOINTMENT (OUTPATIENT)
Dept: ULTRASOUND IMAGING | Facility: HOSPITAL | Age: 26
End: 2021-11-19

## 2021-11-19 VITALS
WEIGHT: 290.57 LBS | RESPIRATION RATE: 18 BRPM | HEIGHT: 64 IN | SYSTOLIC BLOOD PRESSURE: 113 MMHG | BODY MASS INDEX: 49.61 KG/M2 | OXYGEN SATURATION: 100 % | HEART RATE: 71 BPM | TEMPERATURE: 97.5 F | DIASTOLIC BLOOD PRESSURE: 73 MMHG

## 2021-11-19 DIAGNOSIS — R10.11 RIGHT UPPER QUADRANT ABDOMINAL PAIN: Primary | ICD-10-CM

## 2021-11-19 DIAGNOSIS — F43.9 STRESS: ICD-10-CM

## 2021-11-19 DIAGNOSIS — K76.0 HEPATIC STEATOSIS: ICD-10-CM

## 2021-11-19 DIAGNOSIS — R11.2 NON-INTRACTABLE VOMITING WITH NAUSEA, UNSPECIFIED VOMITING TYPE: ICD-10-CM

## 2021-11-19 LAB
ALBUMIN SERPL-MCNC: 4 G/DL (ref 3.5–5.2)
ALBUMIN/GLOB SERPL: 1.4 G/DL
ALP SERPL-CCNC: 94 U/L (ref 39–117)
ALT SERPL W P-5'-P-CCNC: 28 U/L (ref 1–33)
ANION GAP SERPL CALCULATED.3IONS-SCNC: 14 MMOL/L (ref 5–15)
AST SERPL-CCNC: 23 U/L (ref 1–32)
BACTERIA UR QL AUTO: ABNORMAL /HPF
BASOPHILS # BLD AUTO: 0 10*3/MM3 (ref 0–0.2)
BASOPHILS NFR BLD AUTO: 0.4 % (ref 0–1.5)
BILIRUB SERPL-MCNC: 0.5 MG/DL (ref 0–1.2)
BILIRUB UR QL STRIP: NEGATIVE
BUN SERPL-MCNC: 8 MG/DL (ref 6–20)
BUN/CREAT SERPL: 11.4 (ref 7–25)
CALCIUM SPEC-SCNC: 8.7 MG/DL (ref 8.6–10.5)
CHLORIDE SERPL-SCNC: 104 MMOL/L (ref 98–107)
CLARITY UR: ABNORMAL
CO2 SERPL-SCNC: 21 MMOL/L (ref 22–29)
COLOR UR: YELLOW
CREAT SERPL-MCNC: 0.7 MG/DL (ref 0.57–1)
DEPRECATED RDW RBC AUTO: 42 FL (ref 37–54)
EOSINOPHIL # BLD AUTO: 0 10*3/MM3 (ref 0–0.4)
EOSINOPHIL NFR BLD AUTO: 0.5 % (ref 0.3–6.2)
ERYTHROCYTE [DISTWIDTH] IN BLOOD BY AUTOMATED COUNT: 14 % (ref 12.3–15.4)
GFR SERPL CREATININE-BSD FRML MDRD: 104 ML/MIN/1.73
GLOBULIN UR ELPH-MCNC: 2.8 GM/DL
GLUCOSE SERPL-MCNC: 113 MG/DL (ref 65–99)
GLUCOSE UR STRIP-MCNC: NEGATIVE MG/DL
HCG SERPL QL: NEGATIVE
HCT VFR BLD AUTO: 38.1 % (ref 34–46.6)
HGB BLD-MCNC: 13.2 G/DL (ref 12–15.9)
HGB UR QL STRIP.AUTO: NEGATIVE
HYALINE CASTS UR QL AUTO: ABNORMAL /LPF
KETONES UR QL STRIP: NEGATIVE
LEUKOCYTE ESTERASE UR QL STRIP.AUTO: ABNORMAL
LIPASE SERPL-CCNC: 22 U/L (ref 13–60)
LYMPHOCYTES # BLD AUTO: 2.2 10*3/MM3 (ref 0.7–3.1)
LYMPHOCYTES NFR BLD AUTO: 27.3 % (ref 19.6–45.3)
MCH RBC QN AUTO: 29.8 PG (ref 26.6–33)
MCHC RBC AUTO-ENTMCNC: 34.8 G/DL (ref 31.5–35.7)
MCV RBC AUTO: 85.7 FL (ref 79–97)
MONOCYTES # BLD AUTO: 0.3 10*3/MM3 (ref 0.1–0.9)
MONOCYTES NFR BLD AUTO: 4.2 % (ref 5–12)
NEUTROPHILS NFR BLD AUTO: 5.4 10*3/MM3 (ref 1.7–7)
NEUTROPHILS NFR BLD AUTO: 67.6 % (ref 42.7–76)
NITRITE UR QL STRIP: NEGATIVE
NRBC BLD AUTO-RTO: 0 /100 WBC (ref 0–0.2)
PH UR STRIP.AUTO: 7 [PH] (ref 5–8)
PLATELET # BLD AUTO: 187 10*3/MM3 (ref 140–450)
PMV BLD AUTO: 9.1 FL (ref 6–12)
POTASSIUM SERPL-SCNC: 3.8 MMOL/L (ref 3.5–5.2)
PROT SERPL-MCNC: 6.8 G/DL (ref 6–8.5)
PROT UR QL STRIP: NEGATIVE
RBC # BLD AUTO: 4.44 10*6/MM3 (ref 3.77–5.28)
RBC # UR STRIP: ABNORMAL /HPF
REF LAB TEST METHOD: ABNORMAL
SODIUM SERPL-SCNC: 139 MMOL/L (ref 136–145)
SP GR UR STRIP: 1.01 (ref 1–1.03)
SQUAMOUS #/AREA URNS HPF: ABNORMAL /HPF
UROBILINOGEN UR QL STRIP: ABNORMAL
WBC # UR STRIP: ABNORMAL /HPF
WBC NRBC COR # BLD: 7.9 10*3/MM3 (ref 3.4–10.8)

## 2021-11-19 PROCEDURE — 84703 CHORIONIC GONADOTROPIN ASSAY: CPT | Performed by: EMERGENCY MEDICINE

## 2021-11-19 PROCEDURE — 74177 CT ABD & PELVIS W/CONTRAST: CPT

## 2021-11-19 PROCEDURE — 25010000002 CEFTRIAXONE PER 250 MG: Performed by: EMERGENCY MEDICINE

## 2021-11-19 PROCEDURE — 96374 THER/PROPH/DIAG INJ IV PUSH: CPT

## 2021-11-19 PROCEDURE — 87086 URINE CULTURE/COLONY COUNT: CPT | Performed by: EMERGENCY MEDICINE

## 2021-11-19 PROCEDURE — 0 IOPAMIDOL PER 1 ML: Performed by: EMERGENCY MEDICINE

## 2021-11-19 PROCEDURE — 81001 URINALYSIS AUTO W/SCOPE: CPT | Performed by: EMERGENCY MEDICINE

## 2021-11-19 PROCEDURE — 80053 COMPREHEN METABOLIC PANEL: CPT | Performed by: EMERGENCY MEDICINE

## 2021-11-19 PROCEDURE — 96375 TX/PRO/DX INJ NEW DRUG ADDON: CPT

## 2021-11-19 PROCEDURE — 0 MORPHINE SULFATE 4 MG/ML SOLUTION: Performed by: EMERGENCY MEDICINE

## 2021-11-19 PROCEDURE — 76705 ECHO EXAM OF ABDOMEN: CPT

## 2021-11-19 PROCEDURE — 25010000002 ONDANSETRON PER 1 MG: Performed by: EMERGENCY MEDICINE

## 2021-11-19 PROCEDURE — 83690 ASSAY OF LIPASE: CPT | Performed by: EMERGENCY MEDICINE

## 2021-11-19 PROCEDURE — 99283 EMERGENCY DEPT VISIT LOW MDM: CPT

## 2021-11-19 PROCEDURE — 85025 COMPLETE CBC W/AUTO DIFF WBC: CPT | Performed by: EMERGENCY MEDICINE

## 2021-11-19 RX ORDER — SUCRALFATE ORAL 1 G/10ML
1 SUSPENSION ORAL
Qty: 560 ML | Refills: 0 | Status: SHIPPED | OUTPATIENT
Start: 2021-11-19 | End: 2021-12-03

## 2021-11-19 RX ORDER — PANTOPRAZOLE SODIUM 40 MG/1
40 TABLET, DELAYED RELEASE ORAL DAILY
Qty: 14 TABLET | Refills: 0 | Status: SHIPPED | OUTPATIENT
Start: 2021-11-19 | End: 2021-12-03

## 2021-11-19 RX ORDER — MORPHINE SULFATE 4 MG/ML
4 INJECTION, SOLUTION INTRAMUSCULAR; INTRAVENOUS ONCE
Status: COMPLETED | OUTPATIENT
Start: 2021-11-19 | End: 2021-11-19

## 2021-11-19 RX ORDER — ONDANSETRON 2 MG/ML
4 INJECTION INTRAMUSCULAR; INTRAVENOUS ONCE
Status: COMPLETED | OUTPATIENT
Start: 2021-11-19 | End: 2021-11-19

## 2021-11-19 RX ADMIN — IOPAMIDOL 100 ML: 755 INJECTION, SOLUTION INTRAVENOUS at 06:04

## 2021-11-19 RX ADMIN — ONDANSETRON 4 MG: 2 INJECTION INTRAMUSCULAR; INTRAVENOUS at 05:32

## 2021-11-19 RX ADMIN — MORPHINE SULFATE 4 MG: 4 INJECTION INTRAVENOUS at 05:45

## 2021-11-19 RX ADMIN — SODIUM CHLORIDE 1000 ML: 9 INJECTION, SOLUTION INTRAVENOUS at 05:44

## 2021-11-19 RX ADMIN — WATER 1 G: 100 INJECTION, SOLUTION INTRAVENOUS at 07:07

## 2021-11-19 NOTE — ED NOTES
Went in to check on pt. Pt went to CT earlier and received contrast stopping IV fluids. IV fluids were never restarted by CT tech. Fluids are now infusing again.     Baljeet Deng RN  11/19/21 0657

## 2021-11-19 NOTE — DISCHARGE INSTRUCTIONS
Take medications as directed as discussed, clear liquid diet for the next 24 hours avoiding anything red or orange as this may appear like blood in your stool or vomit; then advance as tolerated. Low-fat, low residue diet. Keep a food diary including your drinks and dipping sauces. Drink at least 145 ounces of water daily. Schedule follow-up with gastroenterology. Schedule follow-up with primary care for recheck. Return to the ER for new or worsening symptoms.

## 2021-11-19 NOTE — ED PROVIDER NOTES
Subjective   25-year-old female with 48 hours of right upper quadrant pain, worse with palpation and deep breath, associated nausea vomiting diarrhea, nonbloody, no fever.  Got worse yesterday evening after eating chicken [place].          Review of Systems   Gastrointestinal: Positive for abdominal pain, diarrhea, nausea and vomiting.   All other systems reviewed and are negative.      No past medical history on file.    No Known Allergies    Past Surgical History:   Procedure Laterality Date   • ADENOIDECTOMY     • TONSILLECTOMY         No family history on file.    Social History     Socioeconomic History   • Marital status: Single   Tobacco Use   • Smoking status: Never Smoker   • Smokeless tobacco: Never Used   Substance and Sexual Activity   • Alcohol use: Yes     Comment: occassionally           Objective   Physical Exam  Constitutional:       General: She is in acute distress.      Appearance: She is obese.   HENT:      Head: Normocephalic and atraumatic.      Mouth/Throat:      Mouth: Mucous membranes are moist.      Pharynx: Oropharynx is clear.   Eyes:      Conjunctiva/sclera: Conjunctivae normal.      Pupils: Pupils are equal, round, and reactive to light.   Cardiovascular:      Rate and Rhythm: Normal rate and regular rhythm.      Heart sounds: Normal heart sounds.   Pulmonary:      Effort: Pulmonary effort is normal.      Breath sounds: Normal breath sounds.   Abdominal:      General: Bowel sounds are normal.      Palpations: Abdomen is soft.      Comments: Moderate right upper quadrant tenderness, no rebound or guarding   Musculoskeletal:         General: Normal range of motion.   Skin:     General: Skin is warm and dry.      Capillary Refill: Capillary refill takes less than 2 seconds.   Neurological:      General: No focal deficit present.      Mental Status: She is alert and oriented to person, place, and time.   Psychiatric:         Mood and Affect: Mood normal.         Behavior: Behavior normal.          Procedures           ED Course  ED Course as of 11/22/21 2246   Fri Nov 19, 2021   0840 Waiting ultrasound results. [AL]      ED Course User Index  [AL] Yana Palencia, APRN                                           MDM  Number of Diagnoses or Management Options  Hepatic steatosis  Non-intractable vomiting with nausea, unspecified vomiting type  Right upper quadrant abdominal pain  Stress  Diagnosis management comments: Results for orders placed or performed during the hospital encounter of 11/19/21  -Urine Culture - Urine, Urine, Clean Catch:   Specimen: Urine, Clean Catch       Result                      Value             Ref Range           Urine Culture                                                 25,000 CFU/mL Normal Urogenital Millicent  -Comprehensive Metabolic Panel:   Specimen: Blood       Result                      Value             Ref Range           Glucose                     113 (H)           65 - 99 mg/dL       BUN                         8                 6 - 20 mg/dL        Creatinine                  0.70              0.57 - 1.00 *       Sodium                      139               136 - 145 mm*       Potassium                   3.8               3.5 - 5.2 mm*       Chloride                    104               98 - 107 mmo*       CO2                         21.0 (L)          22.0 - 29.0 *       Calcium                     8.7               8.6 - 10.5 m*       Total Protein               6.8               6.0 - 8.5 g/*       Albumin                     4.00              3.50 - 5.20 *       ALT (SGPT)                  28                1 - 33 U/L          AST (SGOT)                  23                1 - 32 U/L          Alkaline Phosphatase        94                39 - 117 U/L        Total Bilirubin             0.5               0.0 - 1.2 mg*       eGFR Non African Amer       104               >60 mL/min/1*       Globulin                    2.8               gm/dL               A/G Ratio                    1.4               g/dL                BUN/Creatinine Ratio        11.4              7.0 - 25.0          Anion Gap                   14.0              5.0 - 15.0 m*  -Lipase:   Specimen: Blood       Result                      Value             Ref Range           Lipase                      22                13 - 60 U/L    -hCG, Serum, Qualitative:   Specimen: Blood       Result                      Value             Ref Range           HCG Qualitative             Negative          Negative       -Urinalysis With Culture If Indicated - Urine, Clean Catch:   Specimen: Urine, Clean Catch       Result                      Value             Ref Range           Color, UA                   Yellow            Yellow, Straw       Appearance, UA              Hazy (A)          Clear               pH, UA                      7.0               5.0 - 8.0           Specific [place], UA        1.014             1.005 - 1.030       Glucose, UA                 Negative          Negative            Ketones, UA                 Negative          Negative            Bilirubin, UA               Negative          Negative            Blood, UA                   Negative          Negative            Protein, UA                 Negative          Negative            Leuk Esterase, UA           Trace (A)         Negative            Nitrite, UA                 Negative          Negative            Urobilinogen, UA            0.2 E.U./dL       0.2 - 1.0 E.*  -CBC Auto Differential:   Specimen: Blood       Result                      Value             Ref Range           WBC                         7.90              3.40 - 10.80*       RBC                         4.44              3.77 - 5.28 *       Hemoglobin                  13.2              12.0 - 15.9 *       Hematocrit                  38.1              34.0 - 46.6 %       MCV                         85.7              79.0 - 97.0 *       MCH                         29.8               26.6 - 33.0 *       MCHC                        34.8              31.5 - 35.7 *       RDW                         14.0              12.3 - 15.4 %       RDW-SD                      42.0              37.0 - 54.0 *       MPV                         9.1               6.0 - 12.0 fL       Platelets                   187               140 - 450 10*       Neutrophil %                67.6              42.7 - 76.0 %       Lymphocyte %                27.3              19.6 - 45.3 %       Monocyte %                  4.2 (L)           5.0 - 12.0 %        Eosinophil %                0.5               0.3 - 6.2 %         Basophil %                  0.4               0.0 - 1.5 %         Neutrophils, Absolute       5.40              1.70 - 7.00 *       Lymphocytes, Absolute       2.20              0.70 - 3.10 *       Monocytes, Absolute         0.30              0.10 - 0.90 *       Eosinophils, Absolute       0.00              0.00 - 0.40 *       Basophils, Absolute         0.00              0.00 - 0.20 *       nRBC                        0.0               0.0 - 0.2 /1*  -Urinalysis, Microscopic Only - Urine, Clean Catch:   Specimen: Urine, Clean Catch       Result                      Value             Ref Range           RBC, UA                     0-2 (A)           None Seen /H*       WBC, UA                     31-50 (A)         None Seen /H*       Bacteria, UA                3+ (A)            None Seen /H*       Squamous Epithelial Ce*     3-6 (A)           None Seen, 0*       Hyaline Casts, UA           None Seen         None Seen /L*       Methodology                                                   Manual Light Microscopy  US Abdomen Limited   Final Result    1. Borderline hepatomegaly with mild hepatic steatosis.    2. Remainder of the examination is within normal limits.         Electronically Signed By-Jena Cheney MD On:11/19/2021 9:14 AM    This report was finalized on [id number] by  Jena Cheney  MD.     CT Abdomen Pelvis With Contrast   Final Result        No acute process within the abdomen or pelvis.                    Electronically signed by:  Uvaldo Marte D.O.      11/19/2021 4:22 AM            Amount and/or Complexity of Data Reviewed  Clinical lab tests: reviewed and ordered  Tests in the radiology section of CPT®: reviewed and ordered  Tests in the medicine section of CPT®: ordered and reviewed        Final diagnoses:   Right upper quadrant abdominal pain   Non-intractable vomiting with nausea, unspecified vomiting type   Stress   Hepatic steatosis       ED Disposition  ED Disposition     ED Disposition Condition Comment    Discharge Stable           Mariah Swanson, APRN  [address] [facility] [place] [id number] [phone]    Schedule an appointment as soon as possible for a visit       [facility]  [address] [facility] [address] [phone]  Schedule an appointment as soon as possible for a visit       [facility] EMERGENCY DEPARTMENT  [address] [facility] [address] [phone]  Go to   If symptoms worsen         Medication List      New Prescriptions    pantoprazole 40 MG EC tablet  Commonly known as: PROTONIX  Take 1 tablet by mouth Daily for 14 days.     sucralfate 1 GM/10ML suspension  Commonly known as: CARAFATE  Take 10 mL by mouth 4 (Four) Times a Day With Meals & at Bedtime for 14 days.           Where to Get Your Medications      These medications were sent to [facility] [place], [place] - [address] [facility]  - [phone]  - [phone] FX  [address] [facility] [place] [address]    Phone: [phone]   · pantoprazole 40 MG EC tablet  · sucralfate 1 GM/10ML suspension          Rui Friedman MD  11/22/21 7538

## 2021-11-19 NOTE — ED PROVIDER NOTES
Care was assumed from Dr. Friedman pending ultrasound results. HPI, ROS, PE, and MDM performed per previous provider. Ultrasound was significant for hepatic steatosis, otherwise no acute findings. Upon reassessment, patient reports that her pain has improved. She does report increased stress related to upcoming wedding. Patient was given prescriptions for PPI and Carafate. She was given referral for GI. She was encouraged to eat a clear liquid diet avoiding anything red for the next 24 to 48 hours advancing as tolerated. She is encouraged to eat a low-fat low residue diet. She is pink warm and dry no distress noted her vital signs are stable.    US Abdomen Limited   Final Result   1. Borderline hepatomegaly with mild hepatic steatosis.   2. Remainder of the examination is within normal limits.       Electronically Signed By-Jena Cheney MD On:11/19/2021 9:14 AM   This report was finalized on [id number] by  Jena Cheney MD.      CT Abdomen Pelvis With Contrast   Final Result      No acute process within the abdomen or pelvis.               Electronically signed by:  Uvaldo Marte D.O.     11/19/2021 4:22 AM           Yana Palencia APRN  11/19/21 0953

## 2021-11-20 LAB — BACTERIA SPEC AEROBE CULT: NORMAL

## 2022-09-30 ENCOUNTER — HOSPITAL ENCOUNTER (EMERGENCY)
Facility: HOSPITAL | Age: 27
Discharge: HOME | End: 2022-09-30
Admitting: EMERGENCY MEDICINE

## 2022-09-30 ENCOUNTER — APPOINTMENT (OUTPATIENT)
Dept: GENERAL RADIOLOGY | Facility: HOSPITAL | Age: 27
End: 2022-09-30

## 2022-09-30 VITALS
DIASTOLIC BLOOD PRESSURE: 95 MMHG | WEIGHT: 293 LBS | HEART RATE: 79 BPM | RESPIRATION RATE: 18 BRPM | OXYGEN SATURATION: 99 % | HEIGHT: 64 IN | SYSTOLIC BLOOD PRESSURE: 144 MMHG | TEMPERATURE: 98.5 F | BODY MASS INDEX: 50.02 KG/M2

## 2022-09-30 DIAGNOSIS — S60.00XA CONTUSION OF FINGER WITHOUT DAMAGE TO NAIL, UNSPECIFIED FINGER, UNSPECIFIED LATERALITY, INITIAL ENCOUNTER: ICD-10-CM

## 2022-09-30 DIAGNOSIS — S61.209A OPEN WOUND OF FINGER, INITIAL ENCOUNTER: ICD-10-CM
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
## 2022-09-30 PROCEDURE — 99283 EMERGENCY DEPT VISIT LOW MDM: CPT

## 2022-09-30 PROCEDURE — 73130 X-RAY EXAM OF HAND: CPT

## 2022-09-30 PROCEDURE — 25010000002 TETANUS-DIPHTH-ACELL PERTUSSIS 5-2.5-18.5 LF-MCG/0.5 SUSPENSION PREFILLED SYRINGE: Performed by: NURSE PRACTITIONER

## 2022-09-30 PROCEDURE — 90715 TDAP VACCINE 7 YRS/> IM: CPT | Performed by: NURSE PRACTITIONER

## 2022-09-30 PROCEDURE — 90471 IMMUNIZATION ADMIN: CPT | Performed by: NURSE PRACTITIONER

## 2022-09-30 RX ORDER — IBUPROFEN 800 MG/1
800 TABLET ORAL EVERY 6 HOURS PRN
Qty: 9 TABLET | Refills: 0 | Status: SHIPPED | OUTPATIENT
Start: 2022-09-30

## 2022-09-30 RX ORDER — AMOXICILLIN AND CLAVULANATE POTASSIUM 875; 125 MG/1; MG/1
1 TABLET, FILM COATED ORAL 2 TIMES DAILY
Qty: 14 TABLET | Refills: 0 | Status: SHIPPED | OUTPATIENT
Start: 2022-09-30

## 2022-09-30 RX ADMIN — TETANUS TOXOID, REDUCED DIPHTHERIA TOXOID AND ACELLULAR PERTUSSIS VACCINE, ADSORBED 0.5 ML: 5; 2.5; 8; 8; 2.5 SUSPENSION INTRAMUSCULAR at 12:15

## 2023-10-19 ENCOUNTER — HOSPITAL ENCOUNTER (EMERGENCY)
Facility: HOSPITAL | Age: 28
Discharge: HOME | End: 2023-10-19
Attending: EMERGENCY MEDICINE
Payer: COMMERCIAL

## 2023-10-19 VITALS
SYSTOLIC BLOOD PRESSURE: 127 MMHG | RESPIRATION RATE: 18 BRPM | TEMPERATURE: 98.1 F | BODY MASS INDEX: 50.02 KG/M2 | HEIGHT: 64 IN | OXYGEN SATURATION: 95 % | DIASTOLIC BLOOD PRESSURE: 78 MMHG | WEIGHT: 293 LBS | HEART RATE: 81 BPM

## 2023-10-19 DIAGNOSIS — A09 DIARRHEA OF INFECTIOUS ORIGIN: ICD-10-CM

## 2023-10-19 DIAGNOSIS — R10.9 ACUTE ABDOMINAL PAIN: Primary | ICD-10-CM

## 2023-10-19 LAB
ADV 40+41 DNA STL QL NAA+NON-PROBE: NOT DETECTED
ALBUMIN SERPL-MCNC: 4.1 G/DL (ref 3.5–5.2)
ALBUMIN/GLOB SERPL: 1.5 G/DL
ALP SERPL-CCNC: 83 U/L (ref 39–117)
ALT SERPL W P-5'-P-CCNC: 44 U/L (ref 1–33)
ANION GAP SERPL CALCULATED.3IONS-SCNC: 13 MMOL/L (ref 5–15)
AST SERPL-CCNC: 39 U/L (ref 1–32)
ASTRO TYP 1-8 RNA STL QL NAA+NON-PROBE: NOT DETECTED
BACTERIA UR QL AUTO: ABNORMAL /HPF
BASOPHILS # BLD AUTO: 0 10*3/MM3 (ref 0–0.2)
BASOPHILS NFR BLD AUTO: 0.1 % (ref 0–1.5)
BILIRUB SERPL-MCNC: 0.5 MG/DL (ref 0–1.2)
BILIRUB UR QL STRIP: ABNORMAL
BUN SERPL-MCNC: 7 MG/DL (ref 6–20)
BUN/CREAT SERPL: 10.6 (ref 7–25)
C CAYETANENSIS DNA STL QL NAA+NON-PROBE: NOT DETECTED
C COLI+JEJ+UPSA DNA STL QL NAA+NON-PROBE: NOT DETECTED
C DIFF GDH + TOXINS A+B STL QL IA.RAPID: NEGATIVE
C DIFF GDH + TOXINS A+B STL QL IA.RAPID: NEGATIVE
CALCIUM SPEC-SCNC: 9 MG/DL (ref 8.6–10.5)
CHLORIDE SERPL-SCNC: 102 MMOL/L (ref 98–107)
CLARITY UR: ABNORMAL
CO2 SERPL-SCNC: 22 MMOL/L (ref 22–29)
COD CRY URNS QL: ABNORMAL /HPF
COLOR UR: ABNORMAL
CREAT SERPL-MCNC: 0.66 MG/DL (ref 0.57–1)
CRYPTOSP DNA STL QL NAA+NON-PROBE: DETECTED
DEPRECATED RDW RBC AUTO: 45.1 FL (ref 37–54)
E HISTOLYT DNA STL QL NAA+NON-PROBE: NOT DETECTED
EAEC PAA PLAS AGGR+AATA ST NAA+NON-PRB: NOT DETECTED
EC STX1+STX2 GENES STL QL NAA+NON-PROBE: NOT DETECTED
EGFRCR SERPLBLD CKD-EPI 2021: 125 ML/MIN/1.73
EOSINOPHIL # BLD AUTO: 0.1 10*3/MM3 (ref 0–0.4)
EOSINOPHIL NFR BLD AUTO: 1.3 % (ref 0.3–6.2)
EPEC EAE GENE STL QL NAA+NON-PROBE: NOT DETECTED
ERYTHROCYTE [DISTWIDTH] IN BLOOD BY AUTOMATED COUNT: 14.5 % (ref 12.3–15.4)
ETEC LTA+ST1A+ST1B TOX ST NAA+NON-PROBE: NOT DETECTED
G LAMBLIA DNA STL QL NAA+NON-PROBE: NOT DETECTED
GLOBULIN UR ELPH-MCNC: 2.8 GM/DL
GLUCOSE SERPL-MCNC: 125 MG/DL (ref 65–99)
GLUCOSE UR STRIP-MCNC: NEGATIVE MG/DL
HCG SERPL QL: NEGATIVE
HCT VFR BLD AUTO: 38.5 % (ref 34–46.6)
HGB BLD-MCNC: 13 G/DL (ref 12–15.9)
HGB UR QL STRIP.AUTO: NEGATIVE
HYALINE CASTS UR QL AUTO: ABNORMAL /LPF
KETONES UR QL STRIP: NEGATIVE
LEUKOCYTE ESTERASE UR QL STRIP.AUTO: ABNORMAL
LYMPHOCYTES # BLD AUTO: 1.6 10*3/MM3 (ref 0.7–3.1)
LYMPHOCYTES NFR BLD AUTO: 22.6 % (ref 19.6–45.3)
MCH RBC QN AUTO: 28.2 PG (ref 26.6–33)
MCHC RBC AUTO-ENTMCNC: 33.8 G/DL (ref 31.5–35.7)
MCV RBC AUTO: 83.2 FL (ref 79–97)
MONOCYTES # BLD AUTO: 0.5 10*3/MM3 (ref 0.1–0.9)
MONOCYTES NFR BLD AUTO: 7.2 % (ref 5–12)
NEUTROPHILS NFR BLD AUTO: 4.8 10*3/MM3 (ref 1.7–7)
NEUTROPHILS NFR BLD AUTO: 68.8 % (ref 42.7–76)
NITRITE UR QL STRIP: NEGATIVE
NOROVIRUS GI+II RNA STL QL NAA+NON-PROBE: NOT DETECTED
NRBC BLD AUTO-RTO: 0.2 /100 WBC (ref 0–0.2)
P SHIGELLOIDES DNA STL QL NAA+NON-PROBE: NOT DETECTED
PH UR STRIP.AUTO: 5.5 [PH] (ref 5–8)
PLATELET # BLD AUTO: 160 10*3/MM3 (ref 140–450)
PMV BLD AUTO: 9.9 FL (ref 6–12)
POTASSIUM SERPL-SCNC: 3.3 MMOL/L (ref 3.5–5.2)
PROT SERPL-MCNC: 6.9 G/DL (ref 6–8.5)
PROT UR QL STRIP: ABNORMAL
RBC # BLD AUTO: 4.62 10*6/MM3 (ref 3.77–5.28)
RBC # UR STRIP: ABNORMAL /HPF
REF LAB TEST METHOD: ABNORMAL
RVA RNA STL QL NAA+NON-PROBE: NOT DETECTED
S ENT+BONG DNA STL QL NAA+NON-PROBE: NOT DETECTED
SAPO I+II+IV+V RNA STL QL NAA+NON-PROBE: NOT DETECTED
SHIGELLA SP+EIEC IPAH ST NAA+NON-PROBE: NOT DETECTED
SODIUM SERPL-SCNC: 137 MMOL/L (ref 136–145)
SP GR UR STRIP: 1.02 (ref 1–1.03)
SQUAMOUS #/AREA URNS HPF: ABNORMAL /HPF
UROBILINOGEN UR QL STRIP: ABNORMAL
V CHOL+PARA+VUL DNA STL QL NAA+NON-PROBE: NOT DETECTED
V CHOLERAE DNA STL QL NAA+NON-PROBE: NOT DETECTED
WBC # UR STRIP: ABNORMAL /HPF
WBC NRBC COR # BLD: 6.9 10*3/MM3 (ref 3.4–10.8)
Y ENTEROCOL DNA STL QL NAA+NON-PROBE: NOT DETECTED

## 2023-10-19 PROCEDURE — 99283 EMERGENCY DEPT VISIT LOW MDM: CPT

## 2023-10-19 PROCEDURE — 87449 NOS EACH ORGANISM AG IA: CPT | Performed by: EMERGENCY MEDICINE

## 2023-10-19 PROCEDURE — 96372 THER/PROPH/DIAG INJ SC/IM: CPT

## 2023-10-19 PROCEDURE — 87324 CLOSTRIDIUM AG IA: CPT | Performed by: EMERGENCY MEDICINE

## 2023-10-19 PROCEDURE — 25810000003 SODIUM CHLORIDE 0.9 % SOLUTION: Performed by: EMERGENCY MEDICINE

## 2023-10-19 PROCEDURE — 80053 COMPREHEN METABOLIC PANEL: CPT | Performed by: EMERGENCY MEDICINE

## 2023-10-19 PROCEDURE — 85025 COMPLETE CBC W/AUTO DIFF WBC: CPT | Performed by: EMERGENCY MEDICINE

## 2023-10-19 PROCEDURE — 84703 CHORIONIC GONADOTROPIN ASSAY: CPT | Performed by: EMERGENCY MEDICINE

## 2023-10-19 PROCEDURE — 96374 THER/PROPH/DIAG INJ IV PUSH: CPT

## 2023-10-19 PROCEDURE — 25010000002 DICYCLOMINE PER 20 MG: Performed by: EMERGENCY MEDICINE

## 2023-10-19 PROCEDURE — 87507 IADNA-DNA/RNA PROBE TQ 12-25: CPT | Performed by: EMERGENCY MEDICINE

## 2023-10-19 PROCEDURE — 25010000002 METOCLOPRAMIDE PER 10 MG: Performed by: EMERGENCY MEDICINE

## 2023-10-19 PROCEDURE — 81001 URINALYSIS AUTO W/SCOPE: CPT | Performed by: EMERGENCY MEDICINE

## 2023-10-19 RX ORDER — ONDANSETRON 4 MG/1
4 TABLET, FILM COATED ORAL EVERY 8 HOURS PRN
Qty: 20 TABLET | Refills: 0 | Status: SHIPPED | OUTPATIENT
Start: 2023-10-19

## 2023-10-19 RX ORDER — METOCLOPRAMIDE HYDROCHLORIDE 5 MG/ML
10 INJECTION INTRAMUSCULAR; INTRAVENOUS ONCE
Status: COMPLETED | OUTPATIENT
Start: 2023-10-19 | End: 2023-10-19

## 2023-10-19 RX ORDER — DICYCLOMINE HYDROCHLORIDE 10 MG/ML
20 INJECTION INTRAMUSCULAR 4 TIMES DAILY
Status: DISCONTINUED | OUTPATIENT
Start: 2023-10-19 | End: 2023-10-19

## 2023-10-19 RX ORDER — DICYCLOMINE HCL 20 MG
20 TABLET ORAL EVERY 6 HOURS PRN
Qty: 20 TABLET | Refills: 0 | Status: SHIPPED | OUTPATIENT
Start: 2023-10-19

## 2023-10-19 RX ORDER — POTASSIUM CHLORIDE 20 MEQ/1
40 TABLET, EXTENDED RELEASE ORAL ONCE
Status: COMPLETED | OUTPATIENT
Start: 2023-10-19 | End: 2023-10-19

## 2023-10-19 RX ORDER — DICYCLOMINE HYDROCHLORIDE 10 MG/ML
20 INJECTION INTRAMUSCULAR ONCE
Status: COMPLETED | OUTPATIENT
Start: 2023-10-19 | End: 2023-10-19

## 2023-10-19 RX ADMIN — METOCLOPRAMIDE 10 MG: 5 INJECTION, SOLUTION INTRAMUSCULAR; INTRAVENOUS at 03:03

## 2023-10-19 RX ADMIN — DICYCLOMINE HYDROCHLORIDE 20 MG: 10 INJECTION, SOLUTION INTRAMUSCULAR at 04:18

## 2023-10-19 RX ADMIN — POTASSIUM CHLORIDE 40 MEQ: 1500 TABLET, EXTENDED RELEASE ORAL at 04:18

## 2023-10-19 RX ADMIN — SODIUM CHLORIDE 1000 ML: 9 INJECTION, SOLUTION INTRAVENOUS at 03:03

## 2023-10-19 NOTE — ED PROVIDER NOTES
Subjective   History of Present Illness  27-year-old female with diarrhea, watery with diffuse abdominal cramping, vomiting since Sunday.  No sick contacts, travel, no recent antibiotic use, no history of inflammatory bowel disease      Review of Systems   Constitutional:         Patient had fever Sunday and Monday that has resolved   Gastrointestinal:  Positive for abdominal pain, diarrhea, nausea and vomiting.       No past medical history on file.    No Known Allergies    Past Surgical History:   Procedure Laterality Date    ADENOIDECTOMY      TONSILLECTOMY         No family history on file.    Social History     Socioeconomic History    Marital status:    Tobacco Use    Smoking status: Never    Smokeless tobacco: Never   Substance and Sexual Activity    Alcohol use: Yes     Comment: occassionally           Objective   Physical Exam  Constitutional:       General: She is not in acute distress.     Appearance: She is obese.   HENT:      Head: Normocephalic and atraumatic.      Mouth/Throat:      Mouth: Mucous membranes are moist.      Pharynx: Oropharynx is clear.   Cardiovascular:      Rate and Rhythm: Normal rate and regular rhythm.      Heart sounds: Normal heart sounds.   Pulmonary:      Effort: Pulmonary effort is normal.      Breath sounds: Normal breath sounds.   Abdominal:      General: Bowel sounds are normal. There is no distension.      Palpations: Abdomen is soft.      Tenderness: There is no abdominal tenderness.   Skin:     General: Skin is warm and dry.      Capillary Refill: Capillary refill takes less than 2 seconds.   Neurological:      General: No focal deficit present.      Mental Status: She is alert and oriented to person, place, and time.         Procedures           ED Course                                           Medical Decision Making  27-year-old female with diarrheal illness without any clear complicating features, appears well, nausea controlled, Bentyl helped with cramps,  stool studies pending.    Problems Addressed:  Acute abdominal pain: complicated acute illness or injury  Diarrhea of infectious origin: complicated acute illness or injury    Amount and/or Complexity of Data Reviewed  Labs: ordered.    Risk  Prescription drug management.        Final diagnoses:   Acute abdominal pain   Diarrhea of infectious origin       ED Disposition  ED Disposition       ED Disposition   Discharge    Condition   Stable    Comment   --               No follow-up provider specified.       Medication List        New Prescriptions      dicyclomine 20 MG tablet  Commonly known as: BENTYL  Take 1 tablet by mouth Every 6 (Six) Hours As Needed for Abdominal Cramping.     ondansetron 4 MG tablet  Commonly known as: ZOFRAN  Take 1 tablet by mouth Every 8 (Eight) Hours As Needed for Nausea or Vomiting.               Where to Get Your Medications        These medications were sent to [id number] - [place], [place] - [address] [facility] - [phone]  - [phone] FX  [address] [facility] [place] [address]      Phone: [phone]   dicyclomine 20 MG tablet  ondansetron 4 MG tablet            Rui Friedman MD  10/19/23 0531

## 2023-10-19 NOTE — PROGRESS NOTES
Patient GI PCR panel resulted positive for Cryptosporidium, which is typically a self-limiting infection in immune-competent patients. Patient was given supportive care with Bentyl and Zofran. Discussed case with Zuri GOLDBERG, since patient had no fever and normal white blood cell count she recommended supportive care. No further follow up is required.     Microbiology Results (last 10 days)       Procedure Component Value - Date/Time    Gastrointestinal Panel, PCR - Stool, Per Rectum [[id number]]  (Abnormal) Collected: 10/19/23 0312    Lab Status: Final result Specimen: Stool from Per Rectum Updated: 10/19/23 0614     Campylobacter Not Detected     Plesiomonas shigelloides Not Detected     Salmonella Not Detected     Vibrio Not Detected     Vibrio cholerae Not Detected     Yersinia enterocolitica Not Detected     Enteroaggregative E. coli (EAEC) Not Detected     Enteropathogenic E. coli (EPEC) Not Detected     Enterotoxigenic E. coli (ETEC) lt/st Not Detected     Shiga-like toxin-producing E. coli (STEC) stx1/stx2 Not Detected     Shigella/Enteroinvasive E. coli (EIEC) Not Detected     Cryptosporidium Detected     Cyclospora cayetanensis Not Detected     Entamoeba histolytica Not Detected     Giardia lamblia Not Detected     Adenovirus F40/41 Not Detected     Astrovirus Not Detected     Norovirus GI/GII Not Detected     Rotavirus A Not Detected     Sapovirus (I, II, IV or V) Not Detected    Clostridioides difficile Toxin - Stool, Per Rectum [[id number]]  (Normal) Collected: 10/19/23 0312    Lab Status: Final result Specimen: Stool from Per Rectum Updated: 10/19/23 0731    Narrative:      The following orders were created for panel order Clostridioides difficile Toxin - Stool, Per Rectum.  Procedure                               Abnormality         Status                     ---------                               -----------         ------                     Clostridioides difficile...[[id number]]  Normal               Final result                 Please view results for these tests on the individual orders.    Clostridioides difficile EIA - Stool, Per Rectum [[id number]]  (Normal) Collected: 10/19/23 0312    Lab Status: Final result Specimen: Stool from Per Rectum Updated: 10/19/23 0731     C Diff GDH Ag Negative     C.diff Toxin Ag Negative    Narrative:      The result indicates the absence of toxigenic C.difficile from stool specimen.            Regine Quinn, PharmD  10/19/2023 13:42 EDT

## 2023-10-19 NOTE — Clinical Note
[facility] EMERGENCY DEPARTMENT  [address] [place] [address]  Phone: [phone]    Carey Koch was seen and treated in our emergency department on 10/19/2023.  She may return to work on 10/20/2023.         Thank you for choosing [facility].    Rui Friedman MD